# Patient Record
Sex: FEMALE | Race: BLACK OR AFRICAN AMERICAN
[De-identification: names, ages, dates, MRNs, and addresses within clinical notes are randomized per-mention and may not be internally consistent; named-entity substitution may affect disease eponyms.]

---

## 2023-07-29 ENCOUNTER — HOSPITAL ENCOUNTER (INPATIENT)
Dept: HOSPITAL 54 - ER | Age: 53
LOS: 2 days | Discharge: LEFT BEFORE DISCHARGE | DRG: 244 | End: 2023-07-31
Attending: NURSE PRACTITIONER | Admitting: NURSE PRACTITIONER
Payer: COMMERCIAL

## 2023-07-29 VITALS — DIASTOLIC BLOOD PRESSURE: 73 MMHG | OXYGEN SATURATION: 97 % | TEMPERATURE: 98 F | SYSTOLIC BLOOD PRESSURE: 168 MMHG

## 2023-07-29 VITALS — HEIGHT: 64 IN | WEIGHT: 250 LBS | BODY MASS INDEX: 42.68 KG/M2

## 2023-07-29 DIAGNOSIS — M54.9: ICD-10-CM

## 2023-07-29 DIAGNOSIS — I25.10: ICD-10-CM

## 2023-07-29 DIAGNOSIS — K57.93: Primary | ICD-10-CM

## 2023-07-29 DIAGNOSIS — G89.29: ICD-10-CM

## 2023-07-29 DIAGNOSIS — K63.2: ICD-10-CM

## 2023-07-29 DIAGNOSIS — I10: ICD-10-CM

## 2023-07-29 DIAGNOSIS — E88.09: ICD-10-CM

## 2023-07-29 DIAGNOSIS — E66.01: ICD-10-CM

## 2023-07-29 DIAGNOSIS — Z95.5: ICD-10-CM

## 2023-07-29 DIAGNOSIS — E44.1: ICD-10-CM

## 2023-07-29 DIAGNOSIS — Z98.890: ICD-10-CM

## 2023-07-29 LAB
ALBUMIN SERPL BCP-MCNC: 3 G/DL (ref 3.4–5)
ALP SERPL-CCNC: 93 U/L (ref 46–116)
ALT SERPL W P-5'-P-CCNC: 25 U/L (ref 12–78)
AST SERPL W P-5'-P-CCNC: 18 U/L (ref 15–37)
BASOPHILS # BLD AUTO: 0.1 K/UL (ref 0–0.2)
BASOPHILS NFR BLD AUTO: 0.7 % (ref 0–2)
BILIRUB DIRECT SERPL-MCNC: 0.1 MG/DL (ref 0–0.2)
BILIRUB SERPL-MCNC: 0.3 MG/DL (ref 0.2–1)
BUN SERPL-MCNC: 19 MG/DL (ref 7–18)
CALCIUM SERPL-MCNC: 9.2 MG/DL (ref 8.5–10.1)
CHLORIDE SERPL-SCNC: 107 MMOL/L (ref 98–107)
CO2 SERPL-SCNC: 27 MMOL/L (ref 21–32)
CREAT SERPL-MCNC: 0.8 MG/DL (ref 0.6–1.3)
EOSINOPHIL NFR BLD AUTO: 2.1 % (ref 0–6)
GLUCOSE SERPL-MCNC: 131 MG/DL (ref 74–106)
HCT VFR BLD AUTO: 32 % (ref 33–45)
HGB BLD-MCNC: 10.3 G/DL (ref 11.5–14.8)
LYMPHOCYTES NFR BLD AUTO: 1.8 K/UL (ref 0.8–4.8)
LYMPHOCYTES NFR BLD AUTO: 18.8 % (ref 20–44)
MCHC RBC AUTO-ENTMCNC: 32 G/DL (ref 31–36)
MCV RBC AUTO: 88 FL (ref 82–100)
MONOCYTES NFR BLD AUTO: 0.7 K/UL (ref 0.1–1.3)
MONOCYTES NFR BLD AUTO: 7 % (ref 2–12)
NEUTROPHILS # BLD AUTO: 6.8 K/UL (ref 1.8–8.9)
NEUTROPHILS NFR BLD AUTO: 71.4 % (ref 43–81)
PLATELET # BLD AUTO: 287 K/UL (ref 150–450)
POTASSIUM SERPL-SCNC: 4.3 MMOL/L (ref 3.5–5.1)
PROT SERPL-MCNC: 6.6 G/DL (ref 6.4–8.2)
RBC # BLD AUTO: 3.66 MIL/UL (ref 4–5.2)
SODIUM SERPL-SCNC: 142 MMOL/L (ref 136–145)
WBC NRBC COR # BLD AUTO: 9.5 K/UL (ref 4.3–11)

## 2023-07-29 PROCEDURE — A4216 STERILE WATER/SALINE, 10 ML: HCPCS

## 2023-07-29 PROCEDURE — A6253 ABSORPT DRG > 48 SQ IN W/O B: HCPCS

## 2023-07-29 PROCEDURE — C9113 INJ PANTOPRAZOLE SODIUM, VIA: HCPCS

## 2023-07-29 PROCEDURE — A6403 STERILE GAUZE>16 <= 48 SQ IN: HCPCS

## 2023-07-29 PROCEDURE — G0378 HOSPITAL OBSERVATION PER HR: HCPCS

## 2023-07-29 PROCEDURE — A4223 INFUSION SUPPLIES W/O PUMP: HCPCS

## 2023-07-29 RX ADMIN — SODIUM CHLORIDE PRN MLS/HR: 9 INJECTION, SOLUTION INTRAVENOUS at 22:29

## 2023-07-29 RX ADMIN — SODIUM CHLORIDE SCH MG: 9 INJECTION, SOLUTION INTRAVENOUS at 22:27

## 2023-07-29 NOTE — NUR
RN ADMISSION NOTE



PT ARRIVED IN THE UNIT IN A GURNEY WITH  SON @ BEDSIDE. PT IS TRANSFERRED FROM GURNEY TO 
BED. PT IS A/O X 4, ABLE TO MAKE NEEDS KNOWN. PT IS IN RA TOLERATING WELL, BREATHING EVEN 
AND UNLABORED @ THIS TIME. ORIENTED TO UNIT AND STAFF. PT IV PRESENT ON THE LEFT HAND #20G 
SALINE LOCK, PATENT, INTACT AND FLUSHES WELL W/ NO S&SX OF INFILTRATION @ SITE NOTED. PT 
SKIN IS INTACT, WARM AND DRY. PT HAS AN INCISION @ THE BACK W/ INTACT DRESSING. PT REFUSED 
PHOTO TO BE TAKEN @ THIS TIME. VITAL SIGNS IS TAKEN AND DOCUMENTED. PT'S REFUSED TO HAVE HER 
BELONGING'S TO CHECKED. ALL NEEDS ATTENDED. SAFETY MEASURE IS INITIATED. BED IN LOWEST AND 
LOCK POSITION. SIDE RAILS UP NX 2. BEDSIDE TABLE AND CALL LIGHT IS EASY REACH. BED ALAR, IS 
ON. WILL CONTINUE TO MONITOR PT ACCORDINGLY.

## 2023-07-30 VITALS — TEMPERATURE: 99.4 F | DIASTOLIC BLOOD PRESSURE: 61 MMHG | SYSTOLIC BLOOD PRESSURE: 148 MMHG | OXYGEN SATURATION: 100 %

## 2023-07-30 VITALS — TEMPERATURE: 98.3 F | DIASTOLIC BLOOD PRESSURE: 51 MMHG | OXYGEN SATURATION: 99 % | SYSTOLIC BLOOD PRESSURE: 144 MMHG

## 2023-07-30 VITALS — TEMPERATURE: 98.1 F | SYSTOLIC BLOOD PRESSURE: 173 MMHG | DIASTOLIC BLOOD PRESSURE: 82 MMHG | OXYGEN SATURATION: 97 %

## 2023-07-30 LAB
BASOPHILS # BLD AUTO: 0 K/UL (ref 0–0.2)
BASOPHILS # BLD AUTO: 0.1 K/UL (ref 0–0.2)
BASOPHILS NFR BLD AUTO: 0.4 % (ref 0–2)
BASOPHILS NFR BLD AUTO: 0.7 % (ref 0–2)
BUN SERPL-MCNC: 19 MG/DL (ref 7–18)
CALCIUM SERPL-MCNC: 8.9 MG/DL (ref 8.5–10.1)
CHLORIDE SERPL-SCNC: 107 MMOL/L (ref 98–107)
CO2 SERPL-SCNC: 27 MMOL/L (ref 21–32)
CREAT SERPL-MCNC: 0.8 MG/DL (ref 0.6–1.3)
EOSINOPHIL NFR BLD AUTO: 1.8 % (ref 0–6)
EOSINOPHIL NFR BLD AUTO: 2.5 % (ref 0–6)
FERRITIN SERPL-MCNC: 29 NG/ML (ref 8–388)
GLUCOSE SERPL-MCNC: 123 MG/DL (ref 74–106)
HCT VFR BLD AUTO: 24 % (ref 33–45)
HCT VFR BLD AUTO: 26 % (ref 33–45)
HCT VFR BLD AUTO: 26 % (ref 33–45)
HCT VFR BLD AUTO: 29 % (ref 33–45)
HEMOCCULT STL QL: POSITIVE
HGB BLD-MCNC: 7.8 G/DL (ref 11.5–14.8)
HGB BLD-MCNC: 8.3 G/DL (ref 11.5–14.8)
HGB BLD-MCNC: 8.5 G/DL (ref 11.5–14.8)
HGB BLD-MCNC: 9.4 G/DL (ref 11.5–14.8)
IRON SERPL-MCNC: 33 UG/DL (ref 50–175)
LYMPHOCYTES NFR BLD AUTO: 2.5 K/UL (ref 0.8–4.8)
LYMPHOCYTES NFR BLD AUTO: 2.8 K/UL (ref 0.8–4.8)
LYMPHOCYTES NFR BLD AUTO: 21.8 % (ref 20–44)
LYMPHOCYTES NFR BLD AUTO: 26.3 % (ref 20–44)
MAGNESIUM SERPL-MCNC: 2.1 MG/DL (ref 1.8–2.4)
MCHC RBC AUTO-ENTMCNC: 32 G/DL (ref 31–36)
MCHC RBC AUTO-ENTMCNC: 33 G/DL (ref 31–36)
MCV RBC AUTO: 87 FL (ref 82–100)
MCV RBC AUTO: 87 FL (ref 82–100)
MONOCYTES NFR BLD AUTO: 0.7 K/UL (ref 0.1–1.3)
MONOCYTES NFR BLD AUTO: 0.8 K/UL (ref 0.1–1.3)
MONOCYTES NFR BLD AUTO: 6.2 % (ref 2–12)
MONOCYTES NFR BLD AUTO: 6.7 % (ref 2–12)
NEUTROPHILS # BLD AUTO: 6.9 K/UL (ref 1.8–8.9)
NEUTROPHILS # BLD AUTO: 7.8 K/UL (ref 1.8–8.9)
NEUTROPHILS NFR BLD AUTO: 64.6 % (ref 43–81)
NEUTROPHILS NFR BLD AUTO: 69 % (ref 43–81)
PHOSPHATE SERPL-MCNC: 3.6 MG/DL (ref 2.5–4.9)
PLATELET # BLD AUTO: 265 K/UL (ref 150–450)
PLATELET # BLD AUTO: 266 K/UL (ref 150–450)
POTASSIUM SERPL-SCNC: 4 MMOL/L (ref 3.5–5.1)
RBC # BLD AUTO: 2.98 MIL/UL (ref 4–5.2)
RBC # BLD AUTO: 3.37 MIL/UL (ref 4–5.2)
SODIUM SERPL-SCNC: 141 MMOL/L (ref 136–145)
TIBC SERPL-MCNC: 261 UG/DL (ref 250–450)
TSH SERPL DL<=0.005 MIU/L-ACNC: 0.85 UIU/ML (ref 0.36–3.74)
TSH SERPL DL<=0.005 MIU/L-ACNC: 0.87 UIU/ML (ref 0.36–3.74)
WBC NRBC COR # BLD AUTO: 10.7 K/UL (ref 4.3–11)
WBC NRBC COR # BLD AUTO: 11.3 K/UL (ref 4.3–11)

## 2023-07-30 RX ADMIN — Medication PRN MG: at 09:57

## 2023-07-30 RX ADMIN — SODIUM CHLORIDE SCH MG: 9 INJECTION, SOLUTION INTRAVENOUS at 09:56

## 2023-07-30 RX ADMIN — SODIUM CHLORIDE SCH MG: 9 INJECTION, SOLUTION INTRAVENOUS at 21:00

## 2023-07-30 RX ADMIN — Medication PRN MG: at 14:07

## 2023-07-30 RX ADMIN — Medication PRN TAB: at 18:57

## 2023-07-30 RX ADMIN — Medication PRN MG: at 01:37

## 2023-07-30 RX ADMIN — Medication PRN MG: at 05:42

## 2023-07-30 NOTE — NUR
RN NOTES

PATIENT COMPLAINS OF PAIN. NO ORAL PAIN MEDICATION ORDER TO BE ABLE TO GIVE NOW. INFORMED NP 
YVES ANDERSON, NO NEW ORDER GIVEN. INFORMED PATIENT THAT SHE HAS TO WAIT SINCE MD DID NOT ORDER 
ANY OTHER PAIN MEDICATION.

## 2023-07-30 NOTE — NUR
RN NOTES: [facility]



RN CALLED KATLIN LOPES TO REQUEST FOR MED RECORD, SPOKE TO MOZAFAR, STATES SHE WILL FAX MED 
RECORDS TO SECURE FAX#

-------------------------------------------------------------------------------

Addendum: 07/30/23 at 1955 by DAGO VALLADARES RN

-------------------------------------------------------------------------------

PLS DISREGARD THIS ENTRY FOR THIS PT

## 2023-07-30 NOTE — NUR
MS RN OPENING NOTES: GOLYTELY STILL  FULL



RECEIVED PATIENT ASLEEP, EASILY ROUSED, A/O X 4, AMBULATORY, ABLE TO MAKE NEEDS KNOWN.  
STABLE ON ROOM AIR, NO S/S OF DISTRESS NOTED, DENIES PAIN AT THIS TIME.  CURRENTLY ON REG 
CLOTHES, REFUSED TO CHANGE INTO HOSPITAL GOWN. BOWEL PREP AT BEDSIDE, AM RN ADVISED PT TO 
CONSUME AS ORDERED IN ORDER TO PREP FOR 8AM GI INTERVENTION, PT STATES SHE UNDERSTOOD AND 
THAT "I DIDN'T HAVE TO USE SO 2000 WORDS TO EXPLAIN". ALL SAFETY MEASURES IMPLEMENTED, BED 
LOCKED IN LOWEST POSITION, SIDE RAILS UP X 2, CALL LIGHT AND TABLE WITHIN REACH; WILL CONT 
WITH PLAN OF CARE DURING SHIFT. 




-------------------------------------------------------------------------------

Addendum: 07/30/23 at 0806 by DAGO VALLADARES RN

-------------------------------------------------------------------------------

IV ACCESS AT R FA #[id number], SL, PATENT, REFUSES IV FLUIDS AT THIS TIME, PT STATES "SHE LIKES TO 
MOVE AROUND".

## 2023-07-30 NOTE — NUR
MS RN TRANSFER OF CARE



RECEIVED PATIENT FROM YARITZA UNGER RN. PATIENT AWAKE IN BED, A/O X 4, AMBULATORY, ABLE TO MAKE 
NEEDS KNOWN, WITH SON AT BEDSIDE; STABLE ON ROOM AIR, BREATHING EVENLY AND NO S/S OF 
DISTRESS NOTED; REFUSED TO BE CHANGED INTO HOSPITAL GOWN; ENCOURAGED VERBALIZATION OF NEEDS; 
SAFETY MEASURES IMPLEMENTED, BED LOCKED IN LOWEST POSITION, SIDE RAILS UP X 2, CALL LIGHT 
AND TABLE WITHIN REACH; WILL CONTINUE TO MONITOR THROUGHOUT SHIFT

## 2023-07-30 NOTE — NUR
RN NOTES

HELD FERLICIT FOR 1900 . PATIENT DOES NOT HAVE ANY IV ACCESS . AWAITING FOR MID LINE 
INSERTION.
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
## 2023-07-30 NOTE — NUR
RN NOTES: HEP PANEL RESULTS FROM [facility], ATTACHED TO PT'S CHART UNDER H&P

-------------------------------------------------------------------------------

Addendum: 07/30/23 at 1955 by DAGO VALLADARES RN

-------------------------------------------------------------------------------

PLEASE DISREGARD THIS ENTRY FOR THIS PT

## 2023-07-30 NOTE — NUR
RN TRANSFER OF CARE



PT IS A/O X 4, ABLE TO MAKE NEEDS KNOWN. PT IS STABLE IN RA. IV INFILTRATED AND REMOVED. 
SAFETY MEASURE IS IN PLACE. ENDORSED TO JINNY FARR RN FOR JOSEY. 

-------------------------------------------------------------------------------

Addendum: 07/30/23 at 0252 by YARITZA UNGER RN

-------------------------------------------------------------------------------

TRANSFER OF CARE @0125.

## 2023-07-30 NOTE — NUR
MS RN CLOSING NOTE



PATIENT AWAKE IN BED, A/O X 4, AMBULATORY, ABLE TO MAKE NEEDS KNOWN, WITH SON AT BEDSIDE; 
STABLE ON ROOM AIR, BREATHING EVENLY AND NO S/S OF DISTRESS NOTED; REFUSED TO BE CHANGED 
INTO HOSPITAL GOWN; PREPARED AND ADVISED TO DRINK GOLYTELY AS PRESCRIBED BUT PATIENT IS NOT 
COOPERATIVE WITH INSTRUCTION; ADMINISTERED MEDICATIONS AS PRESCRIBED; PATIENT'S NEEDS 
ATTENDED; NO COMPLAINTS OF PAIN AND DISCOMFORT AT THIS TIME; SAFETY MEASURES IMPLEMENTED, 
BED LOCKED IN LOWEST POSITION, SIDE RAILS UP X 2, CALL LIGHT AND TABLE WITHIN REACH; WILL 
ENDORSE TO AM NURSE FOR JOSEY.

## 2023-07-30 NOTE — NUR
MS RN NOTE



PATIENT COMPLAINED OF BACK PAIN AND ASKED FOR PAIN MEDICATION. ADMINISTERED MORPHINE 4MG IV 
PRN AS PRESCRIBED AT 0137H; PATIENT TOLERATED WELL, SEEN COMFORTABLY SLEEPING UPON 
REASSESSMENT. WILL CONTINUE TO MONITOR

## 2023-07-30 NOTE — NUR
RN NOTES

PATIENT DOES NOT HAVE ANY IV ACCESS. REQUESTED BY CHARGE NURSE FROM STEFFANIE THE RN MARII  TRIED  
TO INSERT IV AT 2215. SHE TRIED TO INSERT THE IV 2 TIMES NOT SUCCESSFUL. PATIENT  WILL WAIT 
TILL AM TO GET MIDLINE.

## 2023-07-30 NOTE — NUR
MS RN CLOSING NOTES:



PATIENT ASLEEP, EASILY ROUSED, A/O X 4, AMBULATORY, ABLE TO MAKE NEEDS KNOWN.  STABLE ON 
ROOM AIR, C/O PAIN 10/10 AT THIS TIME, NORCO GIVEN BY THIS RN AT 1855 WITH PM RN PRESENT.  
DRESSING AT LOWER BACK NOTED, INTACT AND DRY, PT STILL REFUSED SKIN ASSESSMENT AND PHOTO. 
CURRENTLY ON REG CLOTHES, REFUSED TO CHANGE INTO HOSPITAL GOWN. BOWEL PREP AT BEDSIDE, STILL 
3/4 FULL, RN EXPLAINED IMPORTANCE OF GOLYTELY THROUGHOUT SHIFT, PT STATES "I KNOW", CHARGE 
RN AND MD AWARE. IV ACCESS INFILTRATED, REMOVED,  PENDING MIDLINE INSERTION. PAIN MANAGEMENT 
ADMINISTERED, ALL NEEDS MET. ALL SAFETY MEASURES IMPLEMENTED, BED LOCKED IN LOWEST POSITION, 
SIDE RAILS UP X 2, CALL LIGHT AND TABLE WITHIN REACH; ENDORSED TO PM SHIFT.

## 2023-07-30 NOTE — NUR
MS RN NOTE



PATIENT ASKED FOR PAIN MEDICATION STATING THAT HER BACK HURTS BADLY; ADMINISTERED MORPHINE 
4MG IV PRN AS PRESCRIBED AT 0542H; PATIENT WAS SEEN RESTING COMFORTABLY UPON REASSESSMENT. 
WILL CONTINUE TO MONITOR

## 2023-07-31 VITALS — DIASTOLIC BLOOD PRESSURE: 87 MMHG | OXYGEN SATURATION: 98 % | SYSTOLIC BLOOD PRESSURE: 146 MMHG | TEMPERATURE: 98.1 F

## 2023-07-31 VITALS — DIASTOLIC BLOOD PRESSURE: 89 MMHG | TEMPERATURE: 99 F | OXYGEN SATURATION: 99 % | SYSTOLIC BLOOD PRESSURE: 166 MMHG

## 2023-07-31 LAB
BASOPHILS # BLD AUTO: 0.1 K/UL (ref 0–0.2)
BASOPHILS NFR BLD AUTO: 0.9 % (ref 0–2)
BUN SERPL-MCNC: 14 MG/DL (ref 7–18)
CALCIUM SERPL-MCNC: 9 MG/DL (ref 8.5–10.1)
CHLORIDE SERPL-SCNC: 110 MMOL/L (ref 98–107)
CO2 SERPL-SCNC: 24 MMOL/L (ref 21–32)
CREAT SERPL-MCNC: 0.7 MG/DL (ref 0.6–1.3)
EOSINOPHIL NFR BLD AUTO: 2 % (ref 0–6)
GLUCOSE SERPL-MCNC: 119 MG/DL (ref 74–106)
HCT VFR BLD AUTO: 24 % (ref 33–45)
HCT VFR BLD AUTO: 25 % (ref 33–45)
HGB BLD-MCNC: 7.7 G/DL (ref 11.5–14.8)
HGB BLD-MCNC: 7.7 G/DL (ref 11.5–14.8)
LYMPHOCYTES NFR BLD AUTO: 2.7 K/UL (ref 0.8–4.8)
LYMPHOCYTES NFR BLD AUTO: 23.8 % (ref 20–44)
MCHC RBC AUTO-ENTMCNC: 31 G/DL (ref 31–36)
MCV RBC AUTO: 91 FL (ref 82–100)
MONOCYTES NFR BLD AUTO: 0.9 K/UL (ref 0.1–1.3)
MONOCYTES NFR BLD AUTO: 8 % (ref 2–12)
NEUTROPHILS # BLD AUTO: 7.5 K/UL (ref 1.8–8.9)
NEUTROPHILS NFR BLD AUTO: 65.3 % (ref 43–81)
PLATELET # BLD AUTO: 278 K/UL (ref 150–450)
POTASSIUM SERPL-SCNC: 3.8 MMOL/L (ref 3.5–5.1)
RBC # BLD AUTO: 2.72 MIL/UL (ref 4–5.2)
SODIUM SERPL-SCNC: 143 MMOL/L (ref 136–145)
WBC NRBC COR # BLD AUTO: 11.5 K/UL (ref 4.3–11)

## 2023-07-31 PROCEDURE — 05HF33Z INSERTION OF INFUSION DEVICE INTO LEFT CEPHALIC VEIN, PERCUTANEOUS APPROACH: ICD-10-PCS | Performed by: NURSE PRACTITIONER

## 2023-07-31 RX ADMIN — DICYCLOMINE HYDROCHLORIDE SCH MG: 10 CAPSULE ORAL at 15:56

## 2023-07-31 RX ADMIN — Medication PRN TAB: at 11:38

## 2023-07-31 RX ADMIN — Medication PRN TAB: at 03:12

## 2023-07-31 RX ADMIN — SODIUM CHLORIDE PRN MLS/HR: 9 INJECTION, SOLUTION INTRAVENOUS at 11:30

## 2023-07-31 RX ADMIN — DICYCLOMINE HYDROCHLORIDE SCH MG: 10 CAPSULE ORAL at 15:50

## 2023-07-31 RX ADMIN — SODIUM CHLORIDE SCH MG: 9 INJECTION, SOLUTION INTRAVENOUS at 09:00

## 2023-07-31 NOTE — NUR
MS RN OPENING NOTE 



RECEIVED PATIENT RESTFUL IN BED A&O  x4,BREATHING WELL ON ROOM AIR,PATIENT APPEARS TO BE 
COMFORTABLE,NO S/S OF PAIN OR DISCOMFORT NOTED AT THE MOMENT,ON ASSESSMENT THE BACK INCISION 
DRESSING INTACT AND CLEAN,

HAS NO IV ACCESS ,AWAITING MIDLINE INSERTION ,

SKIN IS INTACT,SAFETY MEASURES IN PLACE,BED IN LOW POSITION &LOCKED,BED RAILS x2

CALL BELL AND TABLE WITHIN REACH,CONTINUES WITH CURRENT PLAN OF CARE,CLOSE MONITORING,HOURLY 
ROUNDING/PRN

## 2023-07-31 NOTE — NUR
RN NOTES

TYLENOL 650 MG GIVEN FOR PAIN . SINCE THE [place] DUE TIME IS NOT REACHED YET AND PATIENT DOES 
NOT HAVE ANY OTHER MEDICATION. PATIENT AGREES FOR TYLENOL AT THIS TIME.

## 2023-07-31 NOTE — NUR
RN NOTES

NORCO 5/325 MG 1 CAP GIVEN FOR PAIN 8/10 OF THE LOWER BACK AT 0312 . WILL ASSESS PAIN IN 1 
HOUR.

## 2023-07-31 NOTE — NUR
RN NOTE- PT SIGNED AMA . DIDN'T WANT TO CONTINUE TX AND MEDS. ML REMOVED, ID [id number] 
REMOVED. REFUSED PHOTOS. BACK DRESSING DRY INTACT. ESCORTED OUT OF FACILITY BY STAFF AND 
LEFT W FAMILY

## 2023-07-31 NOTE — NUR
MS RN CLOSING NOTES

 PATIENT AWAKE IN BED. PATIENT IS A/O TIMES 4. NO PAIN NOTED.NO SOB NOTED. NO DISTRESS NOTED 
. NO IV ACCESS AT THIS TIME. MIDLINE ORDER IS IN PLACE WAITING FOR MIDLINE INSERTION. ABLE 
TO MAKE NEEDS KNOWN. SON AT THE BED SIDE SLEEPING. PATIENT NPO AT THIS TIME. NEEDS ATTENDED. 
SITTING CLOSE BY PATIENT'S ROOM  FOR CLOSE MONITORING. NO IV MEDS WERE GIVEN. PATIENT 
CONSUMED THE WHOLE BOTTLE OF GOLYTELY. PATIENT HAD 5 TIMES BOWEL MOVEMENT. SLIGHT BLEEDING 
NOTED.  ALL SAFETY MEASURES IN PLACE. BED LOCKED IN THE LOWEST POSITION. CALL LIGHT AND 
TABLE IN EASY REACH. SIDE RAILS UP TIMES 2. WILL  ENDORSE FOR JOSEY.